# Patient Record
Sex: FEMALE | Race: WHITE
[De-identification: names, ages, dates, MRNs, and addresses within clinical notes are randomized per-mention and may not be internally consistent; named-entity substitution may affect disease eponyms.]

---

## 2019-09-06 ENCOUNTER — HOSPITAL ENCOUNTER (OUTPATIENT)
Dept: HOSPITAL 76 - DI.N | Age: 54
Discharge: HOME | End: 2019-09-06
Attending: INTERNAL MEDICINE
Payer: COMMERCIAL

## 2019-09-06 DIAGNOSIS — Z12.31: Primary | ICD-10-CM

## 2019-09-06 DIAGNOSIS — R92.8: ICD-10-CM

## 2019-09-06 PROCEDURE — 77067 SCR MAMMO BI INCL CAD: CPT

## 2019-09-09 NOTE — MAMMOGRAPHY REPORT
Reason:  SCREENING MAMMO

Procedure Date:  09/06/2019   

Accession Number:  [id number] / [id number]                    

Procedure:  MGN - Screening Mammo Dig Bilat CPT Code:  

 

FULL RESULT:

 

 

EXAM: Screening Mammo Dig Bilat

 

DATE: 9/6/2019 3:47 PM

 

CLINICAL HISTORY:  Routine screening

 

TECHNIQUE: (B) - Bilateral  CC and MLO views were obtained.

 

COMPARISON: 6/16/2015 and 7/27/2012

 

PARENCHYMAL PATTERN: (A) - The breasts demonstrate scattered 

fibroglandular densities bilaterally.

 

FINDINGS:

No significant interval change on the right. There are no new suspicious 

masses, calcifications, or areas of distortion. Nodular density upper 

outer breast middle third is stable.

 

On the left there is a faint nodular density in the 12:00 position 4 to 5 

cm posterior to the nipple. Further evaluation by spot compression views 

and possible ultrasound suggested. No suspicious microcalcifications or 

other finding

 

IMPRESSION: Incomplete examination.  BI-RADS category 0.  Needs 

additional evaluation left breast. . Negative right breast.

 

RECOMMENDATION: ([facility]) - Additional views using both Mammography and 

Ultrasound recommended.   Left breast

 

BI-RADS CATEGORY: (0) - Incomplete Examination - need additional 

evaluation.

 

STANDARD QUALIFYING STATEMENTS:

1.  This examination was not reviewed with the aid of Computer-Aided 

Detection (CAD).

2.  A negative or benign  imaging report should not preclude biopsy if 

clinically suspicious findings are present.

3.  Dense breasts may obscure an underlying neoplasm.

4. This examination was reviewed without the aid of 3D breast imaging 

(tomosynthesis).

## 2019-10-01 ENCOUNTER — HOSPITAL ENCOUNTER (OUTPATIENT)
Dept: HOSPITAL 76 - DI | Age: 54
Discharge: HOME | End: 2019-10-01
Attending: INTERNAL MEDICINE
Payer: COMMERCIAL

## 2019-10-01 DIAGNOSIS — R92.8: Primary | ICD-10-CM

## 2019-10-01 PROCEDURE — 76642 ULTRASOUND BREAST LIMITED: CPT

## 2019-10-01 NOTE — MAMMOGRAPHY REPORT
Reason:  ABNORMAL MAMMOGRAM

Procedure Date:  10/01/2019   

Accession Number:  [id number] / [id number]                    

Procedure:  YENNIFER - Diag Special Views Dig LT CPT Code:  

 

FULL RESULT:

 

 

EXAM: Diag Special Views Dig LT; targeted left breast ultrasound.

 

DATE: 10/1/2019 1:46 PM

 

CLINICAL HISTORY:  Call back for indeterminate left focal breast 

asymmetry seen on screening mammogram

 

TECHNIQUE: (L) - Left.  CC and MLO views were obtained followed by 3-D 

mammography. Then, targeted left breast ultrasound was performed.

 

COMPARISON: 9/6/2019, 6/16/2015

 

PARENCHYMAL PATTERN: (A) - The breasts demonstrate scattered 

fibroglandular densities bilaterally.

 

FINDINGS:

Repeat diagnostic views including 3-D mammography, confirm a small 

indeterminate focal breast asymmetry located in the central left breast 

on the nipple line, best seen on left mediolateral cielo slice 33/65. 

There is no corresponding mass on targeted ultrasound.

 

IMPRESSION: Suspicious findings.  BI-RADS category 4.  The findings were 

discussed with the patient.

 

RECOMMENDATION: (BIOPSY) -    Recommend stereotactic biopsy of left breast

 

BI-RADS CATEGORY: (4) - Suspicious.

 

STANDARD QUALIFYING STATEMENTS:

1.  This examination was not reviewed with the aid of Computer-Aided 

Detection (CAD).

2.  A negative or benign  imaging report should not preclude biopsy if 

clinically suspicious findings are present.

3.  Dense breasts may obscure an underlying neoplasm.

4. This examination was reviewed with the aid of 3D breast imaging 

(tomosynthesis).

## 2019-11-26 ENCOUNTER — HOSPITAL ENCOUNTER (OUTPATIENT)
Dept: HOSPITAL 76 - DI | Age: 54
Discharge: HOME | End: 2019-11-26
Attending: INTERNAL MEDICINE
Payer: COMMERCIAL

## 2019-11-26 DIAGNOSIS — N60.12: Primary | ICD-10-CM

## 2019-11-26 PROCEDURE — 19081 BX BREAST 1ST LESION STRTCTC: CPT

## 2019-11-26 NOTE — MAMMOGRAPHY REPORT
Reason:  ABNORMAL MAMMOGRAM

Procedure Date:  11/26/2019   

Accession Number:  [id number] / [id number]                    

Procedure:  YENNIFER - Stereotactic Core BX LT CPT Code:  73589

 

***Final Report***

 

 

FULL RESULT:

 

 

EXAM: Stereotactic Core BX LT

 

DATE: 11/26/2019 12:28 PM

 

CLINICAL HISTORY: ABNORMAL MAMMOGRAM

 

COMPARISON: None.

 

CLINICAL DATA: Target mass measuring 0.8 cm in the central 9 o'clock axis 

of the left breast.

 

Informed consent was obtained. The patient was positioned in the 

mammography machine with biopsy attachment. Targeting imaging was 

obtained with 2-D and 3-D imaging technique and the lesion was selected. 

The breast was approached from the cranial aspect. Using standard aseptic 

technique, 1% buffered lidocaine was injected into the breasts for local 

anesthesia. A small nick was made in the skin with a #11 blade. A 9-gauge 

vacuum-assisted device was advanced into the breasts towards the target 

and confirmatory imaging was obtained to verify targeting and 12 

specimens were obtained.

 

Specimen radiography was performed which demonstrated the presence of 

calcifications in the sample. A biopsy marker clip was then placed into 

the biopsy cavity. The biopsy device was subsequently removed from the 

breast. Hemostasis was achieved.

 

Follow-up 3-D mammography was then performed to verify biopsy targeting 

and clip placement. The mammography showed concordant clip placement .

 

The wound was dressed and ice applied. The patient was observed for 

approximately 15 minutes, then discharged from the diagnostic imaging 

Department in stable condition following instructions on wound care and 

obtaining biopsy results.

 

The tissue was sent for histologic analysis.

 

 

IMPRESSION:

Stereotactic biopsy.

 

[employer]

## 2020-12-14 ENCOUNTER — HOSPITAL ENCOUNTER (OUTPATIENT)
Dept: HOSPITAL 76 - DI | Age: 55
Discharge: HOME | End: 2020-12-14
Attending: FAMILY MEDICINE
Payer: COMMERCIAL

## 2020-12-14 DIAGNOSIS — R92.8: Primary | ICD-10-CM

## 2020-12-15 NOTE — MAMMOGRAPHY REPORT
BILATERAL DIGITAL DIAGNOSTIC MAMMOGRAM 3D/2D: 12/14/2020

CLINICAL: 12 month follow-up biopsy.  

 

Comparison is made to exams dated:  11/26/2019 stereotactic biopsy, 9/6/2019 mammogram, 10/1/2019 yecenia
mogram, and 6/16/2015 mammogram - [facility].  There are scattered fibroglandular e
lements in both breasts.  

 

There is a biopsy site marker on the left breast central to the nipple.  

No significant masses, calcifications, or other findings are seen in either breast.  

 

IMPRESSION: NEGATIVE

There is no mammographic evidence of malignancy. A 1 year screening mammogram is recommended.  

 

 

This exam was interpreted at Station ID: [id number].  

 

NOTE: For mammograms, a report in lay terms will be sent to the patient. Approximately 15% of breast 
malignancies will not be visualized mammographically. In the management of a palpable breast mass, a 
negative mammogram must not discourage biopsy of a clinically suspicious lesion.

 

Electronically Signed By: Delvis Velasquez M.D.          

aty/:12/14/2020 09:01:20  

 

 

 

ACR BI-RADS Category 1: Negative 3341F

PARENCHYMAL PATTERN: (A) - The breast(s) demonstrate(s) scattered fibroglandular densities.

BI-RADS CATEGORY: (1) - 1

RECOMMENDATION: (ANNUAL)  - Recommend routine annual screening mammography.

[id number]

1 year screening

LATERALITY: (B)

## 2022-07-27 ENCOUNTER — HOSPITAL ENCOUNTER (OUTPATIENT)
Dept: HOSPITAL 76 - DI.S | Age: 57
Discharge: HOME | End: 2022-07-27
Attending: INTERNAL MEDICINE
Payer: COMMERCIAL

## 2022-07-27 DIAGNOSIS — Z12.31: Primary | ICD-10-CM

## 2022-07-27 DIAGNOSIS — Z80.3: ICD-10-CM

## 2022-07-28 NOTE — MAMMOGRAPHY REPORT
BILATERAL DIGITAL SCREENING MAMMOGRAM 3D/2D: 7/27/2022

CLINICAL: Routine screening. Family history of breast cancer.  

 

Comparison is made to exams dated:  9/6/2019 mammogram, and 6/16/2015 mammogram - [facility].  There are scattered fibroglandular elements in both breasts.  

 

There is a biopsy clip in the left breast.  

No significant masses, calcifications, or other findings are seen in either breast.  

There has been no significant interval change.

 

IMPRESSION: NEGATIVE

There is no mammographic evidence of malignancy. A 1 year screening mammogram is recommended.  

 

Based on the Tyrer Cuzick model (a risk assessment model) the patients lifetime risk is 9.5% and her
 10 year risk is 3.7%. According to the ACR, ACS, and NCCN guidelines, an annual breast MRI exam mono
g with mammogram is recommended if the patients lifetime risk is 20% or greater.

 

 

This exam was interpreted at Station ID: [id number].  

 

NOTE: For mammograms, a report in lay terms will be sent to the patient. Approximately 15% of breast 
malignancies will not be visualized mammographically. In the management of a palpable breast mass, a 
negative mammogram must not discourage biopsy of a clinically suspicious lesion.

 

Electronically Signed By: Shan Adams M.D.          

slc/:7/27/2022 17:20:41  

 

 

 

ACR BI-RADS Category 1: Negative 3341F

PARENCHYMAL PATTERN: (A) - The breast(s) demonstrate(s) scattered fibroglandular densities.

BI-RADS CATEGORY: (1) - 1

RECOMMENDATION: (ANNUAL)  - Recommend routine annual screening mammography.

[id number]

1 year screening

LATERALITY: (B)

## 2024-08-16 ENCOUNTER — HOSPITAL ENCOUNTER (OUTPATIENT)
Dept: HOSPITAL 76 - DI | Age: 59
Discharge: HOME | End: 2024-08-16
Attending: PHYSICAL MEDICINE & REHABILITATION
Payer: COMMERCIAL

## 2024-08-16 DIAGNOSIS — N85.9: ICD-10-CM

## 2024-08-16 DIAGNOSIS — M25.451: Primary | ICD-10-CM
